# Patient Record
(demographics unavailable — no encounter records)

---

## 2024-12-12 NOTE — HISTORY OF PRESENT ILLNESS
[FreeTextEntry1] : 56yo M with PMHx HIV, Cirrhosis, hep B in the office for evaluation of RA  History: 7 years ago developed intense body aches stiffness described in AM medical evaluation with + labs for RA irregular follow up 2-3 years ago resume medical follow up with [facility] started on SSZ + HCQ ( 2 years treatment) 3 months ago started on Humira 40 mg for ongoing symptoms  ROS patient describes predominant articular pain no major synovitis described no dactylitis no history of psoriasis symptoms described over large and small joints   Today: feels well no stiffness no synovitis feels that Humira has helped to some degree with his symptoms

## 2024-12-30 NOTE — REASON FOR VISIT
[FreeTextEntry1] : 55 year old man with h/o HIV UD VL CD4 700s on Genvoya, liver cirrhosis, HBV, rheumatoid arthritis presents for follow-up of chest pain. He describes his chest pain as sharp and stabbing over the left and middle of chest occurring sporadically. Patient is minimally active, does not exercise. Chest pain is sometimes associated with headaches and dizziness. Unclear exertional component, occurs sporadically at rest and sometimes with activity.  Denies associated lightheadedness, syncope, dyspnea, or palpitations. Had history of syncope/presyncope in setting of poppers and Viagra. Started on Humara for RA.   Since last visit, chest pain has improved with improvement in BP. Denies dyspnea, lightheadedness, palpitations, orthopnea, PND or TEJA.   PCP at [facility].  Smoker 20 years half pack a day, quit 10 years ago, no illicits Previously socially EtOH 1 to 2 glasses of wine a day, 2 to 3 glasses of apple martini, now abstinent  Family history not significant for early CAD or heart disease

## 2024-12-30 NOTE — PHYSICAL EXAM
[No Acute Distress] : no acute distress [Normal Venous Pressure] : normal venous pressure [No Carotid Bruit] : no carotid bruit [Normal S1, S2] : normal S1, S2 [No Murmur] : no murmur [No Rub] : no rub [No Gallop] : no gallop [Soft] : abdomen soft [Non Tender] : non-tender [No Edema] : no edema [de-identified] : No chest tenderness to palpation

## 2024-12-30 NOTE — REASON FOR VISIT
[FreeTextEntry1] : 55 year old man with h/o HIV UD VL CD4 700s on Genvoya, liver cirrhosis, HBV, rheumatoid arthritis presents for follow-up of chest pain. He describes his chest pain as sharp and stabbing over the left and middle of chest occurring sporadically. Patient is minimally active, does not exercise. Chest pain is sometimes associated with headaches and dizziness. Unclear exertional component, occurs sporadically at rest and sometimes with activity.  Denies associated lightheadedness, syncope, dyspnea, or palpitations. Had history of syncope/presyncope in setting of poppers and Viagra. Started on Humara for RA.   Since last visit, chest pain has improved with improvement in BP. Denies dyspnea, lightheadedness, palpitations, orthopnea, PND or TEJA.   PCP at [facility].  Smoker 20 years half pack a day, quit 10 years ago, no illicits Previously socially EtOH 1 to 2 glasses of wine a day, 2 to 3 glasses of apple martini, now abstinent  Family history not significant for early CAD or heart disease

## 2024-12-30 NOTE — ASSESSMENT
[FreeTextEntry1] : 55 year old man with h/o KARSON on CPAP, HIV UD VL CD4 700s on Genvoya, HBV, liver cirrhosis, newly diagnosed RA started on Humara here for follow-up. Has been having recrudescence of dull pressure-like chest pain, often associated with activity but sometimes at rest as well, now resolved.  #Chest pain: Improved with BP meds, discussed implications of myocardial bridge, not likely cause of symptoms given shallow bridge - CCTA 10/2024 showed CAC 0 with mild nonobstructive CAD, shallow mid LAD bridge with mild luminal narrowing - Continue Crestor  #HLD: Given elevated risk factors with HIV, RA and HBV, continue rosuvastatin 5 mg daily. Reviewed LFTs and side effects with patient.   #HTN: Diastolic HTN. BP at goal.  - Continue lisinopril 10 mg daily - Continue HCTZ 25 mg daily - Discussed lifestyle modifications and salt moderation at length, 30 minutes exercise daily, Mediterranean dietary pattern, increased fiber intake  - Patient self-discontinued propranolol for anxiety, continues on Zoloft  #Presyncope: Counseled to avoid simultaneous Viagra and popper use and staying hydrated.

## 2024-12-30 NOTE — PHYSICAL EXAM
[No Acute Distress] : no acute distress [Normal Venous Pressure] : normal venous pressure [No Carotid Bruit] : no carotid bruit [Normal S1, S2] : normal S1, S2 [No Murmur] : no murmur [No Rub] : no rub [No Gallop] : no gallop [Soft] : abdomen soft [Non Tender] : non-tender [No Edema] : no edema [de-identified] : No chest tenderness to palpation

## 2025-01-02 NOTE — HISTORY OF PRESENT ILLNESS
[de-identified] : 1/2/25: 54 y/o M presents with cough every day for the past 3 months. He reports cough is mostly dry, 2-3 coughing fits throughout the day. No triggers, not worsened with certain smells. History of KARSON and on CPAP. No issues eating, chewing, or swallowing. No issues breathing, never been tested for asthma. No regurgitation of food. History of reflux, takes Omeprazole 40 mg every other day (he decided to take it like this) for the past 2 months, feels it is not helpful with cough. He also feels his voice is intermittently hoarse, not worse with overuse. 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 Former smoker, quit 20 years ago. History of HTN, on Lisinopril, started taking it in May. During this 3 month time period he was sick in October and cough worsened, tested positive for parainfluenza and supportive treatment was recommended.  Also endorses postnasal drip. He denies nasal congestion, facial pain, changes in smell or taste. He uses Nasonex with minimal relief. History of 3 septoplasties, last was in 2017 at [place].  He has history of left tympanoplasty when he was a child. He has known hearing loss, last audiogram was 1 year ago.  Diet: +coffee, soda, chocolate, garlic, onion -tea, mint, tomato, citrus, blueberry, spicy food, carbonated beverages water intake: 3 glasses  late night eating: sometimes

## 2025-01-02 NOTE — PROCEDURE
[FreeTextEntry6] : - Nasal Endoscopy:   Pre-operative Diagnosis: Postnasal drip  Post-operative Diagnosis: Evidence of postsurgical changes, right sinus surgery in the past  Anesthesia: Topical Procedure: Bilateral nasal endoscopy Procedure Details:   After topical anesthesia and decongestant, the patient was placed in the supine position. The telescope was passed along the left nasal floor to the nasopharynx. It was then passed into the region of the middle meatus, middle turbinate, and the sphenoethmoid region. An identical procedure was performed on the right side.   Findings: Mucosa:   Evidence of postsurgical changes, right sinus surgery in the past  Nasal septum: normal Discharge:  none Turbinates:  normal Adenoid:   normal Posterior choanae:  normal Eustachian tubes:  normal Mucous stranding:  normal  Lesions:   Not present   Comments:   Condition: Stable. Patient tolerated procedure well. [de-identified] : -   Pre-operative Diagnosis: Dysphonia, Throat discomfort Post-operative Diagnosis: laryngopharyngeal reflux, bilateral TVF swelling, left base of tongue lesion likely papilloma  Anesthesia: Topical - 1 % Lidocaine/Phenylephrine Procedure: Flexible Laryngoscopy with Stroboscopy - CPT 39667   Procedure Details: The patient was placed in the sitting position. After decongestant and anesthesia were applied the laryngoscope was passed. The nasal cavities, nasopharynx, oropharynx, hypopharynx, and larynx were all examined. Vocal folds were examined during respiration and phonation. The following findings were noted:   Findings: Nose: Septum is midline, turbinates are normal, nasal airways patent, mucosa normal Nasopharynx: Adenoids normal, no masses, eustachian tube normal Oropharynx: Pharyngeal walls symmetric and without lesion. Tonsils/fossae symmetric Hypopharynx: Hypopharynx and pyriform sinuses without lesion. No masses or asymmetry. +left base of tongue lesion likely papilloma  Larynx: Epiglottis and aryepiglottic folds were sharp and crisp bilaterally. Bilateral false vocal folds normal appearance. Airway was widely patent. + postcricoid edema, erythema of the vocal process   Strobe Exam Ratings   TVF Appearance: normal, mild erythema of the vocal process, bilateral TVF swelling TVF Mobility: normal Edema/hypertrophy: normal, + postcricoid edema Mucus on TVF: normal Glottic Closure: normal/adequate Mucosal Wave: normal Amplitude of Vibration: normal Phase: symmetric Supraglottic Hyperfunction: none Other Findings: none   Condition: Stable. Patient tolerated procedure well.   Complications: None

## 2025-01-02 NOTE — PHYSICAL EXAM
[Normal] : mucosa is normal [Midline] : trachea located in midline position [FreeTextEntry1] : voice is hoarse, raspy, decreased range  [de-identified] : left tympanosclerosis

## 2025-01-02 NOTE — ASSESSMENT
[FreeTextEntry1] : - 1/2/25: 54 y/o M presents with cough every day for the past 3 months. He reports cough is mostly dry, 2-3 coughing fits throughout the day. Based on history and physical exam, I do believe there is a component of laryngopharyngeal reflux. At this time I am recommending dietary and behavioral change to reduce acid in the diet. I am also recommending omeprazole daily and to add Famotidine. I am recommending consultation with pulmonologist to rule out asthma. I recommended a Medrol dose pack and Tessalon Perles and a Chest Xray to rule out pneumonia. I recommended he discuss alternatives to Lisinopril with his internist to manage his HTN.  Also endorses postnasal drip. On physical exam he was found to have evidence of postsurgical changes and previous right sinus surgery. I am recommending nasal saline rinses and nasal antihistamines.   Incidental finding of left base of tongue lesion.  0.5 cm appears papillomatous.  Will continue to observe for the next month.  If this is persisting or worsening plan for biopsy OR versus office  For known KARSON I recommended he follow up with sleep medicine specialists. Follow up with me in 4-6 weeks. If symptoms persist consider treatment for neurogenic cough.   He has history of left tympanoplasty when he was a child. He has known hearing loss, last audiogram was 1 year ago. I recommended consultation with neurotology, given information to establish care.   -Dietary and behavioral modification to reduce acid reflux, handout given -Omeprazole daily, add Famotidine qhs on regimented basis -Voice hygiene, increase hydration, sips of water throughout the day, avoid throat clearing, stop coughing  -Medrol dose pack -Tessalon Perles  -CXR -nasal saline rinses and nasal antihistamines for postnasal drip -consultation with pulmonology to rule out asthma -consultation with sleep medicine as per request of patient -consultation with neurotology as per request of patient -Follow up in 4-6 weeks  -Closely follow the left base of tongue lesion consider biopsy

## 2025-01-21 NOTE — HEALTH RISK ASSESSMENT
[0] : 2) Feeling down, depressed, or hopeless: Not at all (0) [PHQ-2 Negative - No further assessment needed] : PHQ-2 Negative - No further assessment needed [Former] : Former [Excellent] : ~his/her~  mood as  excellent [Yes] : Yes [4 or more  times a week (4 pts)] : 4 or more  times a week (4 points) [1 or 2 (0 pts)] : 1 or 2 (0 points) [Never (0 pts)] : Never (0 points) [> 15 Years] : > 15 Years [No falls in past year] : Patient reported no falls in the past year [Patient reported PAP Smear was normal] : Patient reported PAP Smear was normal [Patient reported colonoscopy was normal] : Patient reported colonoscopy was normal [Hepatitis C test offered] : Hepatitis C test offered [None] : None [With Significant Other] : lives with significant other [] :  [Sexually Active] : sexually active [High Risk Behavior] : high risk behavior [Fully functional (bathing, dressing, toileting, transferring, walking, feeding)] : Fully functional (bathing, dressing, toileting, transferring, walking, feeding) [Fully functional (using the telephone, shopping, preparing meals, housekeeping, doing laundry, using] : Fully functional and needs no help or supervision to perform IADLs (using the telephone, shopping, preparing meals, housekeeping, doing laundry, using transportation, managing medications and managing finances) [Reports normal functional visual acuity (ie: able to read med bottle)] : Reports normal functional visual acuity [Smoke Detector] : smoke detector [Carbon Monoxide Detector] : carbon monoxide detector [Safety elements used in home] : safety elements used in home [Seat Belt] :  uses seat belt [Sunscreen] : uses sunscreen [Reviewed no changes] : Reviewed, no changes [I will adhere to the patient's wishes.] : I will adhere to the patient's wishes. [Time Spent: ___ minutes] : Time Spent: [unfilled] minutes [10-14] : 10-14 [NO] : No

## 2025-02-04 NOTE — ASSESSMENT
[FreeTextEntry1] : - 1/2/25: 54 y/o M presents with cough every day for the past 3 months. He reports cough is mostly dry, 2-3 coughing fits throughout the day. Based on history and physical exam, I do believe there is a component of laryngopharyngeal reflux. At this time I am recommending dietary and behavioral change to reduce acid in the diet. I am also recommending omeprazole daily and to add Famotidine. I am recommending consultation with pulmonologist to rule out asthma. I recommended a Medrol dose pack and Tessalon Perles and a Chest Xray to rule out pneumonia. I recommended he discuss alternatives to Lisinopril with his internist to manage his HTN.  Also endorses postnasal drip. On physical exam he was found to have evidence of postsurgical changes and previous right sinus surgery. I am recommending nasal saline rinses and nasal antihistamines.   Incidental finding of left base of tongue lesion.  0.5 cm appears papillomatous.  Will continue to observe for the next month.  If this is persisting or worsening plan for biopsy OR versus office  For known KARSON I recommended he follow up with sleep medicine specialists. Follow up with me in 4-6 weeks. If symptoms persist consider treatment for neurogenic cough.   He has history of left tympanoplasty when he was a child. He has known hearing loss, last audiogram was 1 year ago. I recommended consultation with neurotology, given information to establish care.  2/3/2025 Overall stable.  Notes significant improvement in terms of cough essentially back to baseline.  Today presents more for surveillance of base of tongue lesion.  On exam this is unchanged.  Patient is asymptomatic.  At this time I am recommending surveillance every 3 to 6 months for repeat evaluation sooner if there is any new or worsening symptomatology.  -Voice hygiene, increase hydration, sips of water throughout the day, avoid throat clearing, stop coughing  -Follow up in 3-6 months -Closely follow the left base of tongue lesion consider biopsy

## 2025-02-04 NOTE — PHYSICAL EXAM
[FreeTextEntry1] : voice is hoarse, raspy, decreased range  [de-identified] : left tympanosclerosis  [Normal] : mucosa is normal [Midline] : trachea located in midline position

## 2025-02-04 NOTE — PROCEDURE
[de-identified] : -   Pre-operative Diagnosis: Dysphonia, Throat discomfort Post-operative Diagnosis: laryngopharyngeal reflux, bilateral TVF swelling, left base of tongue lesion likely papilloma  Anesthesia: Topical - 1 % Lidocaine/Phenylephrine Procedure: Flexible Laryngoscopy with Stroboscopy - CPT 17898   Procedure Details: The patient was placed in the sitting position. After decongestant and anesthesia were applied the laryngoscope was passed. The nasal cavities, nasopharynx, oropharynx, hypopharynx, and larynx were all examined. Vocal folds were examined during respiration and phonation. The following findings were noted:   Findings: Nose: Septum is midline, turbinates are normal, nasal airways patent, mucosa normal Nasopharynx: Adenoids normal, no masses, eustachian tube normal Oropharynx: Pharyngeal walls symmetric and without lesion. Tonsils/fossae symmetric Hypopharynx: Hypopharynx and pyriform sinuses without lesion. No masses or asymmetry. +left base of tongue lesion likely papilloma  Larynx: Epiglottis and aryepiglottic folds were sharp and crisp bilaterally. Bilateral false vocal folds normal appearance. Airway was widely patent. + postcricoid edema, erythema of the vocal process   Strobe Exam Ratings   TVF Appearance: normal, mild erythema of the vocal process, bilateral TVF swelling TVF Mobility: normal Edema/hypertrophy: normal, + postcricoid edema Mucus on TVF: normal Glottic Closure: normal/adequate Mucosal Wave: normal Amplitude of Vibration: normal Phase: symmetric Supraglottic Hyperfunction: none Other Findings: none   Condition: Stable. Patient tolerated procedure well.   Complications: None

## 2025-02-04 NOTE — HISTORY OF PRESENT ILLNESS
[de-identified] : 1/2/25: 56 y/o M presents with cough every day for the past 3 months. He reports cough is mostly dry, 2-3 coughing fits throughout the day. No triggers, not worsened with certain smells. History of KARSON and on CPAP. No issues eating, chewing, or swallowing. No issues breathing, never been tested for asthma. No regurgitation of food. History of reflux, takes Omeprazole 40 mg every other day (he decided to take it like this) for the past 2 months, feels it is not helpful with cough. He also feels his voice is intermittently hoarse, not worse with overuse. 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 Former smoker, quit 20 years ago. History of HTN, on Lisinopril, started taking it in May. During this 3 month time period he was sick in October and cough worsened, tested positive for parainfluenza and supportive treatment was recommended.  Also endorses postnasal drip. He denies nasal congestion, facial pain, changes in smell or taste. He uses Nasonex with minimal relief. History of 3 septoplasties, last was in 2017 at [place].  He has history of left tympanoplasty when he was a child. He has known hearing loss, last audiogram was 1 year ago.  Diet: +coffee, soda, chocolate, garlic, onion -tea, mint, tomato, citrus, blueberry, spicy food, carbonated beverages water intake: 3 glasses  late night eating: sometimes - [FreeTextEntry1] : 2/3/2025 Patient continues nasal sprays, Tessalon Perles, Medrol.  Notes complete resolution of coughing related symptoms.  Feels back to baseline.  Presents today for surveillance of base of tongue lesion.  No new symptoms.  No concern for bleeding ulceration hemoptysis pain or dysphagia.  No fevers night sweats or weight loss.  No new ENT issues otherwise.

## 2025-03-03 NOTE — CONSULT LETTER
[Dear  ___] : Dear  [unfilled], [Courtesy Letter:] : I had the pleasure of seeing your patient, [unfilled], in my office today. [Please see my note below.] : Please see my note below. [Consult Closing:] : Thank you very much for allowing me to participate in the care of this patient.  If you have any questions, please do not hesitate to contact me. [Sincerely,] : Sincerely, [FreeTextEntry3] : Violetta Natarajan MD  [employer] at [facility] Pulmonary, Critical Care, and Sleep Medicine

## 2025-03-03 NOTE — ASSESSMENT
[FreeTextEntry1] : Reviewed:  HST 2023[facility]24-3/2025: AHI 1.13 events/hour, Avg usage 3 hours 46 minutes, days used >4 hours nightly 46.16,  The patient is a 55-year-old M with PMHx of KARSON, HLD, RA, referred by Dr. Barrett for KARSON follow-up. He was initially diagnosed with moderate KARSON via home testing 2 years ago. He was started on PAP therapy and did well initially with symptomatic improvement but has been struggling more recently with mask leak and discomfort. Will order for a new mask fitting and resupply through [facility] (current [facility]). Discussed alternative tx options including MAD as well as weight loss. He will continue with PAP for now.   Follow-up in 6 months or sooner if needed.

## 2025-03-03 NOTE — ASSESSMENT
[FreeTextEntry1] : Reviewed:  HST 2023[facility]25: AHI 1.13 events/hour, Avg usage 3 hours 46 minutes, days used >4 hours nightly 46.16,  The patient is a 55-year-old M with PMHx of KARSON, HLD, RA, referred by Dr. Barrett for KARSON follow-up. He was initially diagnosed with moderate KARSON via home testing 2 years ago. He was started on PAP therapy and did well initially with symptomatic improvement but has been struggling more recently with mask leak and discomfort. Will order for a new mask fitting and resupply through [facility] (current [facility]). Discussed alternative tx options including MAD as well as weight loss. He will continue with PAP for now.   Follow-up in 6 months or sooner if needed.

## 2025-03-03 NOTE — PHYSICAL EXAM
[General Appearance - Well Developed] : well developed [General Appearance - Well Nourished] : well nourished [] : no respiratory distress [Exaggerated Use Of Accessory Muscles For Inspiration] : no accessory muscle use [Skin Color & Pigmentation] : normal skin color and pigmentation [Oriented To Time, Place, And Person] : oriented to person, place, and time [Impaired Insight] : insight and judgment were intact [Normal Conjunctiva] : the conjunctiva exhibited no abnormalities [Neck Appearance] : the appearance of the neck was normal [Abnormal Walk] : normal gait

## 2025-03-03 NOTE — REVIEW OF SYSTEMS
[Negative] : Psychiatric [Lower Extremity Discomfort] : no lower extremity discomfort [Late day/ Evening symptoms] : no late day/evening symptoms [Unusual Sleep Behavior] : no unusual sleep behavior [Hypersomnolence] : not sleeping much more than usual [Cataplexy] :  no cataplexy

## 2025-03-03 NOTE — HISTORY OF PRESENT ILLNESS
[FreeTextEntry1] : The patient is a 55-year-old M with PMHx of KARSON, HLD, RA, referred by Dr. Barrett for KARSON follow-up. He was initially diagnosed with moderate KARSON via home testing 2 years ago. Had daytime sleepiness at the time.  He was started on PAP therapy and did well initially with symptomatic improvement but has been struggling more recently with mask leak and discomfort. Has not followed up with sleep medicine specialist since he was initiated on PAP therapy.    Mask: Nasal DME: [facility] [ESS] : 7

## 2025-03-03 NOTE — CONSULT LETTER
[Dear  ___] : Dear  [unfilled], [Courtesy Letter:] : I had the pleasure of seeing your patient, [unfilled], in my office today. [Please see my note below.] : Please see my note below. [Consult Closing:] : Thank you very much for allowing me to participate in the care of this patient.  If you have any questions, please do not hesitate to contact me. [Sincerely,] : Sincerely, [FreeTextEntry3] : Violetta Natarajan MD  [employer] at [facility] Pulmonary, Critical Care, and Sleep Medicine

## 2025-03-03 NOTE — END OF VISIT
[FreeTextEntry3] :   I, Violetta Natarajan MD, personally performed the evaluation and management (E/M) services for this patient. That E/M includes conducting the clinically appropriate initial history &/or exam, assessing all conditions, and establishing the plan of care. I have also independently reviewed the medical records and imaging at the time of this office visit, and discussed the above mentioned images with interpretations with the patient. Today, GUILLERMO Ruelas was here to participate in the visit & follow plan of care established by me.

## 2025-03-11 NOTE — HISTORY OF PRESENT ILLNESS
[___ Month(s) Ago] : [unfilled] month(s) ago [FreeTextEntry1] : no inflammatory joint symptoms no constitutional symptoms abnormal cold sensations on lower ext no new medical events

## 2025-03-11 NOTE — PHYSICAL EXAM
[General Appearance - Alert] : alert [Neck Appearance] : the appearance of the neck was normal [Auscultation Breath Sounds / Voice Sounds] : lungs were clear to auscultation bilaterally [Heart Sounds] : normal S1 and S2 [Heart Rate And Rhythm] : heart rate was normal and rhythm regular [Musculoskeletal - Swelling] : no joint swelling seen [Motor Tone] : muscle strength and tone were normal [] : no rash [No Focal Deficits] : no focal deficits [Oriented To Time, Place, And Person] : oriented to person, place, and time

## 2025-03-24 NOTE — HISTORY OF PRESENT ILLNESS
[de-identified] : 55 yo M with h/o HIV (VL CD4 700s on Genvoya), HTN, HLD, anxiety, RA, Sleep apnea (on CPAP), Chronic HBV (1992, tenofovir in Genvoya) and SULLIVAN cirrhosis with portal HTN (hx ascites/LVP 1991) referred by Dr. Radford for cirrhosis/HBV mgmt.  BACKGROUND HIV acquired via homosexual sex discovered 1991 1992 was on HIV trial at [facility], placed on medication and developed ascites (has LVP) elo bloodwork, discovered HBV was started on Lasix for a year, then off with no reaccumulation of ascites Has never been treated for HBV Had liver biopsy- cirr from Hep B (1992) 2011 had jaundice, meds changed endorses elev LFTs last year, thinks it's related to noncompliance HIV meds, resolved after a couple of years and now compliant to meds  SURG HX Cholecystectomy  FAM HX Mother, DM Sibling, Stroke Father, Drug abuse Mother/Sibling Menta2 - Father, colon cancer  SOCIAL HX TOBACCO, former 1/2 pack day for 20 yrs, quit 12 yrs ago ETOH - drinks wine, 4 glasses a week; used to drink a lot more, martinis, vodkas, stopped in Aug, 2023 ever since getting MRI results. , spouse Male,  23 yrs no children Covid vax x 2 Covid infection, x 2  ALLERGY NKDA Allergic to Avocado and Lorena nuts  MEDICATIONS Genvoya daily Valacyclovir 1000mg daily Rosuvastatin 5mg daily Hydroxychloroquine 200mg daily HCTZ 25mg daily omeprazole 20 metoprolol 25mg daily humira 40 q 2 weeks zoloft 100mg daily  STUDIES  ABDL US 12/17/24  IMPRESSION:    1. Mildly enlarged liver with diffusely increased and heterogeneous   echogenicity and coarsened echotexture, compatible with hepatic   steatosis/intrinsic hepatocellular disease. No focal intrahepatic abnormality   is identified; however, the sensitivity of ultrasound is limited in this   setting due to the altered hepatic echotexture. Further assessment with   multiphasic liver protocol CT or MRI is recommended as clinically warranted.   2. Status post cholecystectomy. No biliary dilatation.   CTCA Cardiac:  1. The calcium score is 0 Agatston units .  2. Mild non obstructive coronary atherosclerosis.  3. Shallow mid LAD myocardial bridge with mild luminal narrowing.  Non-cardiac: No suspicious abnormalities. Small 3 mm right middle lobe dense, possibly calcified nodule.  MRI 7/18/24 Hepatic steatosis.  No suspicious focal enhancing hepatic lesion.  MRI/MRE 8/2023 No HCC, mild splenomegaly, no ascites. no intra or extahep jessy dil patent vasculature.  Kpa 4.5 - stage 3-4 Fibrosis  EGD 1/30/23 normal esophagus gastritis - biopsied normal duodenum PATH ?  COLON 2023 per pt no polps  LABS 4/8/24 ALB 4.8 TB 0.;9 AP 73 ALT 38 ASt 29 GGTP 33INR 0.8 PEth NEG HBV Viral DNA Quant ND  HIV Viral load ND (Jan, 2025) AFP 3 Jan, 2025 hg 15.2 hep bsag reactive hep b ab nr CD 4 38%   HEP A 1gg Reactive  INTERVAL HX feels 'good' denies abdl pain, NVD, CP, SOB, f/c appetite good BM 1-2x/day, no rectal blood does not exercise

## 2025-03-24 NOTE — PHYSICAL EXAM
[General Appearance - Alert] : alert [General Appearance - In No Acute Distress] : in no acute distress [PERRL With Normal Accommodation] : pupils were equal in size, round, and reactive to light [Auscultation Breath Sounds / Voice Sounds] : lungs were clear to auscultation bilaterally [Apical Impulse] : the apical impulse was normal [Abdomen Soft] : soft [Abdomen Tenderness] : non-tender [Abnormal Walk] : normal gait [] : no rash [Oriented To Time, Place, And Person] : oriented to person, place, and time [Scleral Icterus] : No Scleral Icterus [Asterixis] : no asterixis observed [Jaundice] : No jaundice [FreeTextEntry1] : small 1 cm round R rib moveable mass

## 2025-03-24 NOTE — ASSESSMENT
[FreeTextEntry1] : 54 yo M with hx HIV on Genvoya, HTN, HLD, RA, SULLIVAN, Chronic HBV (ND)  # Liver Cirrhosis # HCC screening, cirr dx'd by liver bx in [facility] in mid-1990s   MRI 7/18/24 hepatic steatosis, no HCC US 3/17/24, enlarged liver, hep steatosis, no focal intrahp abnormality however limited sensitivity, rec multiphase imaging - will get MRI  # Portal HTN no LVP since 1992, on HCTZ, no TEJA no EV on EGD 1/2023 HE - none  # Fatty liver MRE - KpA 4.5 , Fat content 15.1% will get Fibroscan today Fibroscan today -  KpA 12.9 (S3, F4) fibroscan today Kpa 8    (F4, S3) DIet/exer, reduce carbs, lose 5% weight in 6 mos  # Chronic HBV on Genvoya for HIV which contains Tenofovir HBV Viral load ND, cont to Monitor - followed by NP Guardron advised continue medication for HIV as it has Tenofovir, if HIV med changed, check that it has PI for HBV coverage. compliance to medication reinforced. drinks red wine, couple of glasses with dinner 3x/week, recommend reduce/stop ETOH consumption.  # hx HTN continue f/u Dr. Radford recs metoprolol  # ? small mass on R rib noted one month ago, follows with PCP who per pt said it is a poss fat sac cont f/u with PCP has upcoming abdl MRI   #HCM  COLON 2 yrs ago fam hx col ca fb GE who screens, pt will rescd colon  RTC 6 mos labs today

## 2025-03-28 NOTE — PHYSICAL EXAM
[FreeTextEntry3] : pedunculated skin-colored to brown papules on the neck Scattered well demarcated stuck on appearing brown plaques on the legs patchy hair loss on anterior legs

## 2025-03-28 NOTE — ASSESSMENT
[FreeTextEntry1] : 1) Acrochordons, neck - Counseled about clinically benign lesions - Reviewed risks (as well as mitigation strategies for adverse drug events as applicable), benefits, and alternatives of therapy  - Discussed cosmetic treatments, such as scissor snip excision may be considered. Discussed that treatment is associated with an out of pocket cost. - Patient declined treatment today  2) Stuccokeratoses, feet/legs - Reassured about clinically benign lesions - No treatment indicated at this time. Patient advised to return to clinic if there are any changes or additional concerns   3) Anterolateral leg alopecia - Counseled about clinically benign lesions - No treatment indicated at this time   RTC prn

## 2025-03-28 NOTE — HISTORY OF PRESENT ILLNESS
[FreeTextEntry1] : skin tag [de-identified] : #  skin tags Location: neck Duration: a year Symptoms (itch, pain, bleeding, growth etc): painful and itchy  Also notes bumps on the feet and hair loss on the legs.   Personal history of skin cancer: no Family history of skin cancer: no

## 2025-05-19 NOTE — HISTORY OF PRESENT ILLNESS
[FreeTextEntry1] : HIV Follow up  PHQ-2(0PT) [de-identified] : saw multiple specialists since last visit incl ENT, CPAP (hasn't gotten new mask yet), Derm, Rheum. hasn't gotten new mask yet d/t finding time to do it but CPAP appears to be working well. paroxysmal cough persists, scheduled for surgery in Aug to remove. used inhaler when sick last time and wheezing resolved. hasn't tried again to see if this helps.  didn't take lisinopril today. home BPs: all <120/80, many in 90s/70s.  seeing Dr Radford in 2 weeks.  explosive watery BMs. causing him to miss doses of meds. no normal BMs in last year. predictably within 30-60 minutes of each day of meds, will have urgent abdo pains and needs to run to the bathroom.  having heart palpitations, seems to be related to anxiety and gets panicky during. some CP as well. lasts 1-2 minutes, then resolves. happens 2-3x/day but not usually with pain.  liver MRI is tomorrow. needs labs done for GI

## 2025-05-19 NOTE — PHYSICAL EXAM
[Normal] : no acute distress, well nourished, well developed and well-appearing [Normal Rate] : normal rate  [Regular Rhythm] : with a regular rhythm [de-identified] : regular pulse during episode of chest tightness. pt uses single finger to point to area of pain just left of sternal border at between ribs 4-5. pain not reproducible with palpation of area

## 2025-05-19 NOTE — HEALTH RISK ASSESSMENT
[0] : 2) Feeling down, depressed, or hopeless: Not at all (0) [PHQ-2 Negative - No further assessment needed] : PHQ-2 Negative - No further assessment needed [[id number]] : 0

## 2025-05-19 NOTE — HEALTH RISK ASSESSMENT
[0] : 2) Feeling down, depressed, or hopeless: Not at all (0) [PHQ-2 Negative - No further assessment needed] : PHQ-2 Negative - No further assessment needed [[id number]] : 0

## 2025-05-19 NOTE — RESULTS/DATA
[NSR] : normal sinus rhythm [Ventricular Rate___] : ventricular rate is [unfilled] beats per minute [P Waves Normal] : the P wave is normal [ECG Intervals RI.] : RI interval is normal [Normal QRS] : the QRS is normal [ECG Axis] : the QRS axis is normal [Normal ST Segments] : the ST segments are normal

## 2025-05-19 NOTE — HISTORY OF PRESENT ILLNESS
[FreeTextEntry1] : HIV Follow up  PHQ-2(0PT) [de-identified] : saw multiple specialists since last visit incl ENT, CPAP (hasn't gotten new mask yet), Derm, Rheum. hasn't gotten new mask yet d/t finding time to do it but CPAP appears to be working well. paroxysmal cough persists, scheduled for surgery in Aug to remove. used inhaler when sick last time and wheezing resolved. hasn't tried again to see if this helps.  didn't take lisinopril today. home BPs: all <120/80, many in 90s/70s.  seeing Dr Radford in 2 weeks.  explosive watery BMs. causing him to miss doses of meds. no normal BMs in last year. predictably within 30-60 minutes of each day of meds, will have urgent abdo pains and needs to run to the bathroom.  having heart palpitations, seems to be related to anxiety and gets panicky during. some CP as well. lasts 1-2 minutes, then resolves. happens 2-3x/day but not usually with pain.  liver MRI is tomorrow. needs labs done for GI

## 2025-05-19 NOTE — PHYSICAL EXAM
[Normal] : no acute distress, well nourished, well developed and well-appearing [Normal Rate] : normal rate  [Regular Rhythm] : with a regular rhythm [de-identified] : regular pulse during episode of chest tightness. pt uses single finger to point to area of pain just left of sternal border at between ribs 4-5. pain not reproducible with palpation of area

## 2025-05-19 NOTE — RESULTS/DATA
[NSR] : normal sinus rhythm [Ventricular Rate___] : ventricular rate is [unfilled] beats per minute [P Waves Normal] : the P wave is normal [ECG Intervals NE.] : NE interval is normal [Normal QRS] : the QRS is normal [ECG Axis] : the QRS axis is normal [Normal ST Segments] : the ST segments are normal

## 2025-05-29 NOTE — PHYSICAL EXAM
[Scleral Icterus] : No Scleral Icterus [Asterixis] : no asterixis observed [Jaundice] : No jaundice [General Appearance - Alert] : alert [General Appearance - In No Acute Distress] : in no acute distress [PERRL With Normal Accommodation] : pupils were equal in size, round, and reactive to light [Auscultation Breath Sounds / Voice Sounds] : lungs were clear to auscultation bilaterally [Apical Impulse] : the apical impulse was normal [Abdomen Soft] : soft [Abdomen Tenderness] : non-tender [FreeTextEntry1] : small 1 cm round R rib moveable mass [Abnormal Walk] : normal gait [] : no rash [Oriented To Time, Place, And Person] : oriented to person, place, and time

## 2025-05-29 NOTE — HISTORY OF PRESENT ILLNESS
[FreeTextEntry1] : 54 yo M with h/o HIV (VL CD4 700s on Genvoya), HTN, HLD, anxiety, RA, Sleep apnea (on CPAP), Chronic HBV (1992, tenofovir in Genvoya) and SLULIVAN cirrhosis with portal HTN (hx ascites/LVP 1991) presenting for f/u.   Today, the pt returns after his last visit 3/24/25 where he was recommended for a 6 month f/u.  Labs performed 5/19/25 AST/ALT ALP 34/34, 71; TB 1.1, Alb 5.1 Plt 204, WBC 6.81  AFP 2.9 INR 0.9  HIV VL not detected   BACKGROUND HIV acquired via sexual intercourse discovered 1991 1992 [facility], placed on medication and developed ascites (has LVP) elo bloodwork, discovered HBV was started on Lasix for a year, then off with no reaccumulation of ascites Has never been treated for HBV Had liver biopsy- cirr from Hep B (1992) 2011 had jaundice, meds changed endorses elev LFTs last year, thinks it's related to noncompliance HIV meds, resolved after a couple of years and now compliant to meds  SURG HX Cholecystectomy  FAM HX Mother, DM Sibling, Stroke Father, Drug abuse Mother/Sibling Menta2 - Father, colon cancer  SOCIAL HX TOBACCO, former 1/2 pack day for 20 yrs, quit 12 yrs ago ETOH - drinks wine, 4 glasses a week; used to drink a lot more, martinis, vodkas, stopped in Aug, 2023 ever since getting MRI results. , spouse Male,  23 yrs no children Covid vax x 2 Covid infection, x 2  ALLERGY NKDA Allergic to Avocado and Lorena nuts  MEDICATIONS Genvoya daily Valacyclovir 1000mg daily Rosuvastatin 5mg daily Hydroxychloroquine 200mg daily HCTZ 25mg daily omeprazole 20 metoprolol 25mg daily humira 40 q 2 weeks zoloft 100mg daily  STUDIES  ABDL US 12/17/24 IMPRESSION: 1. Mildly enlarged liver with diffusely increased and heterogeneous echogenicity and coarsened echotexture, compatible with hepatic steatosis/intrinsic hepatocellular disease. No focal intrahepatic abnormality is identified; however, the sensitivity of ultrasound is limited in this setting due to the altered hepatic echotexture. Further assessment with multiphasic liver protocol CT or MRI is recommended as clinically warranted. 2. Status post cholecystectomy. No biliary dilatation.  CTCA Cardiac: 1. The calcium score is 0 Agatston units. 2. Mild non obstructive coronary atherosclerosis. 3. Shallow mid LAD myocardial bridge with mild luminal narrowing.  Non-cardiac: No suspicious abnormalities. Small 3 mm right middle lobe dense, possibly calcified nodule.  MRI 7/18/24 Hepatic steatosis. No suspicious focal enhancing hepatic lesion.  MRI/MRE 8/2023 No HCC, mild splenomegaly, no ascites. no intra or extahep jessy dil patent vasculature.  Kpa 4.5 - stage 3-4 Fibrosis  EGD 1/30/23 normal esophagus gastritis - biopsied normal duodenum PATH ?  COLON 2023 per pt no polps

## 2025-05-29 NOTE — HISTORY OF PRESENT ILLNESS
[FreeTextEntry1] : 56 yo M with h/o HIV (VL CD4 700s on Genvoya), HTN, HLD, anxiety, RA, Sleep apnea (on CPAP), Chronic HBV (1992, tenofovir in Genvoya) and SULLIVAN cirrhosis with portal HTN (hx ascites/LVP 1991) presenting for f/u.   Today, the pt returns after his last visit 3/24/25 where he was recommended for a 6 month f/u.  Labs performed 5/19/25 AST/ALT ALP 34/34, 71; TB 1.1, Alb 5.1 Plt 204, WBC 6.81  AFP 2.9 INR 0.9  HIV VL not detected   BACKGROUND HIV acquired via sexual intercourse discovered 1991 1992 was on HIV trial at [facility], placed on medication and developed ascites (has LVP) elo bloodwork, discovered HBV was started on Lasix for a year, then off with no reaccumulation of ascites Has never been treated for HBV Had liver biopsy- cirr from Hep B (1992) 2011 had jaundice, meds changed endorses elev LFTs last year, thinks it's related to noncompliance HIV meds, resolved after a couple of years and now compliant to meds  SURG HX Cholecystectomy  FAM HX Mother, DM Sibling, Stroke Father, Drug abuse Mother/Sibling Menta2 - Father, colon cancer  SOCIAL HX TOBACCO, former 1/2 pack day for 20 yrs, quit 12 yrs ago ETOH - drinks wine, 4 glasses a week; used to drink a lot more, martinis, vodkas, stopped in Aug, 2023 ever since getting MRI results. , spouse Male,  23 yrs no children Covid vax x 2 Covid infection, x 2  ALLERGY NKDA Allergic to Avocado and Lorena nuts  MEDICATIONS Genvoya daily Valacyclovir 1000mg daily Rosuvastatin 5mg daily Hydroxychloroquine 200mg daily HCTZ 25mg daily omeprazole 20 metoprolol 25mg daily humira 40 q 2 weeks zoloft 100mg daily  STUDIES  ABDL US 12/17/24 IMPRESSION: 1. Mildly enlarged liver with diffusely increased and heterogeneous echogenicity and coarsened echotexture, compatible with hepatic steatosis/intrinsic hepatocellular disease. No focal intrahepatic abnormality is identified; however, the sensitivity of ultrasound is limited in this setting due to the altered hepatic echotexture. Further assessment with multiphasic liver protocol CT or MRI is recommended as clinically warranted. 2. Status post cholecystectomy. No biliary dilatation.  CTCA Cardiac: 1. The calcium score is 0 Agatston units. 2. Mild non obstructive coronary atherosclerosis. 3. Shallow mid LAD myocardial bridge with mild luminal narrowing.  Non-cardiac: No suspicious abnormalities. Small 3 mm right middle lobe dense, possibly calcified nodule.  MRI 7/18/24 Hepatic steatosis. No suspicious focal enhancing hepatic lesion.  MRI/MRE 8/2023 No HCC, mild splenomegaly, no ascites. no intra or extahep jessy dil patent vasculature.  Kpa 4.5 - stage 3-4 Fibrosis  EGD 1/30/23 normal esophagus gastritis - biopsied normal duodenum PATH ?  COLON 2023 per pt no polps

## 2025-07-04 NOTE — ASSESSMENT
[FreeTextEntry1] : 55 year old man with h/o KARSON on CPAP, HIV UD VL CD4 700s on Genvoya, HBV, liver cirrhosis, newly diagnosed RA started on Humara here for follow-up. Has been having recrudescence of dull pressure-like chest pain, often associated with activity but sometimes at rest as well, now resolved.  #Chest pain: Improved with BP meds, discussed implications of myocardial bridge, not likely cause of symptoms given shallow bridge - CCTA 10/2024 showed CAC 0 with mild nonobstructive CAD, shallow mid LAD bridge with mild luminal narrowing - Continue Crestor  #HLD: Given elevated risk factors with HIV, RA and HBV, continue rosuvastatin 5 mg daily. Reviewed LFTs and side effects with patient. LDL 81 in 5/2025 - Advanced lipids for further stratification at next visit  #HTN: Diastolic HTN. BP at goal.  - Continue lisinopril 10 mg daily - Continue HCTZ 25 mg daily and metoprolol XL - Discussed lifestyle modifications and salt moderation at length, 30 minutes exercise daily, Mediterranean dietary pattern, increased fiber intake  - Patient self-discontinued propranolol for anxiety, continues on Zoloft  #Presyncope: Counseled to avoid simultaneous Viagra and popper use and staying hydrated.

## 2025-07-04 NOTE — REASON FOR VISIT
[FreeTextEntry1] : 55 year old man with h/o HIV UD VL CD4 700s on Genvoya, liver cirrhosis, HBV, rheumatoid arthritis presents for follow-up of chest pain. He describes his chest pain as sharp and stabbing over the left and middle of chest occurring sporadically. Chest pain is sometimes associated with headaches and dizziness. Unclear exertional component, occurs sporadically at rest and sometimes with activity.  Denies associated lightheadedness, syncope, dyspnea, or palpitations. Had history of syncope/presyncope in setting of poppers and Viagra. Started on Humara for RA.   Since last visit, had palpitations and chest discomfort for about 3 weeks while driving or sitting around. Zoloft was increased in the interim. Palpitations and chest discomfort has since resolved. Denies dyspnea, lightheadedness, palpitations, orthopnea, PND or TEJA.   SH Smoker 20 years half pack a day, quit 10 years ago, no illicits Previously socially EtOH 1 to 2 glasses of wine a day, 2 to 3 glasses of apple martini, now abstinent  Family history not significant for early CAD or heart disease  patient

## 2025-07-04 NOTE — REASON FOR VISIT
[FreeTextEntry1] : 55 year old man with h/o HIV UD VL CD4 700s on Genvoya, liver cirrhosis, HBV, rheumatoid arthritis presents for follow-up of chest pain. He describes his chest pain as sharp and stabbing over the left and middle of chest occurring sporadically. Chest pain is sometimes associated with headaches and dizziness. Unclear exertional component, occurs sporadically at rest and sometimes with activity.  Denies associated lightheadedness, syncope, dyspnea, or palpitations. Had history of syncope/presyncope in setting of poppers and Viagra. Started on Humara for RA.   Since last visit, had palpitations and chest discomfort for about 3 weeks while driving or sitting around. Zoloft was increased in the interim. Palpitations and chest discomfort has since resolved. Denies dyspnea, lightheadedness, palpitations, orthopnea, PND or TEJA.   SH Smoker 20 years half pack a day, quit 10 years ago, no illicits Previously socially EtOH 1 to 2 glasses of wine a day, 2 to 3 glasses of apple martini, now abstinent  Family history not significant for early CAD or heart disease

## 2025-07-04 NOTE — PHYSICAL EXAM
[No Acute Distress] : no acute distress [Normal Venous Pressure] : normal venous pressure [No Carotid Bruit] : no carotid bruit [Normal S1, S2] : normal S1, S2 [No Murmur] : no murmur [No Rub] : no rub [No Gallop] : no gallop [Soft] : abdomen soft [Non Tender] : non-tender [No Edema] : no edema [de-identified] : No chest tenderness to palpation

## 2025-07-07 NOTE — PHYSICAL EXAM
[General Appearance - Alert] : alert [Neck Appearance] : the appearance of the neck was normal [Auscultation Breath Sounds / Voice Sounds] : lungs were clear to auscultation bilaterally [Heart Rate And Rhythm] : heart rate was normal and rhythm regular [Heart Sounds] : normal S1 and S2 [Musculoskeletal - Swelling] : no joint swelling seen [Motor Tone] : muscle strength and tone were normal [] : no rash [No Focal Deficits] : no focal deficits [Oriented To Time, Place, And Person] : oriented to person, place, and time [FreeTextEntry1] : ttp over mcps

## 2025-07-07 NOTE — HISTORY OF PRESENT ILLNESS
[FreeTextEntry1] : patient continues to description soreness hands no inflammatory findings trial of prednisone, if no benefits will consider other etiologies. to d/c statin after steroid trial.

## 2025-07-21 NOTE — PHYSICAL EXAM
[Normal] : no acute distress, well nourished, well developed and well-appearing [de-identified] : Minicog administered 5/5, no impairment

## 2025-07-21 NOTE — PHYSICAL EXAM
[Normal] : no acute distress, well nourished, well developed and well-appearing [de-identified] : Minicog administered 5/5, no impairment

## 2025-07-21 NOTE — HISTORY OF PRESENT ILLNESS
[FreeTextEntry1] : HIV follow up [de-identified] : diarrhea stopped completely. not sure why. eating more yogurt. never did stool PCR. improvement didn't seem to be connected to zoloft.  incr zoloft last time, no changes noted good or bad in mood or SEs. interested in further incr. saw rheum, thinks may be non-RA.  stopped sulfasalazine. tried prednisone taper which was very helpful. symptoms still in basically all joints but no swelling, just feeling like he needs to stretch them all. seeing neurol this week re: neuropathy but also concerned about brain fog x 6 months. last COVID 3 years ago. no noted improvement or worsening with incr zoloft. sleep is good on CPAP. no signif stress. does have racing thoughts, thinks has OCD/intrusive thoughts. sertraline not helping as much as duloxetine did for these Sx. trouble expressing thoughts, getting stuck trying to speak in public. sometimes thoughts come out slightly different than what he means to say, ex: using wrong person's name. also c/o fatigue and not wanting to do anything which feels connected to brain fog. not leaving stove on, not getting lost, not forgetting close daily contacts. also feels like thinking and processing info slower. reading/writing/typing all fine. emails are unaffected.  has indicated he's noticing probs too.

## 2025-07-21 NOTE — HISTORY OF PRESENT ILLNESS
[FreeTextEntry1] : HIV follow up [de-identified] : diarrhea stopped completely. not sure why. eating more yogurt. never did stool PCR. improvement didn't seem to be connected to zoloft.  incr zoloft last time, no changes noted good or bad in mood or SEs. interested in further incr. saw rheum, thinks may be non-RA.  stopped sulfasalazine. tried prednisone taper which was very helpful. symptoms still in basically all joints but no swelling, just feeling like he needs to stretch them all. seeing neurol this week re: neuropathy but also concerned about brain fog x 6 months. last COVID 3 years ago. no noted improvement or worsening with incr zoloft. sleep is good on CPAP. no signif stress. does have racing thoughts, thinks has OCD/intrusive thoughts. sertraline not helping as much as duloxetine did for these Sx. trouble expressing thoughts, getting stuck trying to speak in public. sometimes thoughts come out slightly different than what he means to say, ex: using wrong person's name. also c/o fatigue and not wanting to do anything which feels connected to brain fog. not leaving stove on, not getting lost, not forgetting close daily contacts. also feels like thinking and processing info slower. reading/writing/typing all fine. emails are unaffected.  has indicated he's noticing probs too.